# Patient Record
Sex: MALE | Race: ASIAN | Employment: FULL TIME | URBAN - METROPOLITAN AREA
[De-identification: names, ages, dates, MRNs, and addresses within clinical notes are randomized per-mention and may not be internally consistent; named-entity substitution may affect disease eponyms.]

---

## 2017-05-12 ENCOUNTER — RADIANT APPOINTMENT (OUTPATIENT)
Dept: GENERAL RADIOLOGY | Facility: CLINIC | Age: 22
End: 2017-05-12
Attending: INTERNAL MEDICINE
Payer: COMMERCIAL

## 2017-05-12 ENCOUNTER — OFFICE VISIT (OUTPATIENT)
Dept: FAMILY MEDICINE | Facility: CLINIC | Age: 22
End: 2017-05-12
Payer: COMMERCIAL

## 2017-05-12 VITALS
HEIGHT: 66 IN | RESPIRATION RATE: 16 BRPM | HEART RATE: 60 BPM | TEMPERATURE: 98.6 F | OXYGEN SATURATION: 100 % | BODY MASS INDEX: 24.59 KG/M2 | WEIGHT: 153 LBS | SYSTOLIC BLOOD PRESSURE: 110 MMHG | DIASTOLIC BLOOD PRESSURE: 60 MMHG

## 2017-05-12 DIAGNOSIS — M25.571 ACUTE RIGHT ANKLE PAIN: Primary | ICD-10-CM

## 2017-05-12 DIAGNOSIS — M25.571 ACUTE RIGHT ANKLE PAIN: ICD-10-CM

## 2017-05-12 DIAGNOSIS — S93.401A SPRAIN OF RIGHT ANKLE, UNSPECIFIED LIGAMENT, INITIAL ENCOUNTER: ICD-10-CM

## 2017-05-12 PROCEDURE — 99213 OFFICE O/P EST LOW 20 MIN: CPT | Performed by: INTERNAL MEDICINE

## 2017-05-12 PROCEDURE — 73610 X-RAY EXAM OF ANKLE: CPT | Mod: RT

## 2017-05-12 NOTE — PROGRESS NOTES
"Chief Complaint   Patient presents with     Trauma     rt       Initial /60  Pulse 60  Temp 98.6  F (37  C)  Resp 16  Ht 5' 6\" (1.676 m)  Wt 153 lb (69.4 kg)  SpO2 100%  BMI 24.69 kg/m2 Estimated body mass index is 24.69 kg/(m^2) as calculated from the following:    Height as of this encounter: 5' 6\" (1.676 m).    Weight as of this encounter: 153 lb (69.4 kg).  Medication Reconciliation: complete. BIANCA Urbina LPN        SUBJECTIVE:                                                    Timothy Londono is a 22 year old male who presents to clinic today for the following health issues:      Joint Pain     Onset: Wed 5/10/17    Description:   Location: right ankle  Character: ache with weight    Intensity: mild    Progression of Symptoms: same    Accompanying Signs & Symptoms:  Other symptoms: swelling   History:   Previous similar pain: no       Precipitating factors:   Trauma or overuse: YES- stepped on rock and twisted Wed 5/10/17    Alleviating factors:  Improved by: rest/inactivity and ice       Therapies Tried and outcome: ice         Two days ago was in the water walking and stepped on a rock and twisted his ankle. He is still experiencing pain just distal to his lateral malleolus and there is some swelling. He can weight bear and walk.       Problem list and histories reviewed & adjusted, as indicated.  Additional history: as documented    Patient Active Problem List   Diagnosis     Allergic rhinitis     Left ankle pain     High ankle sprain     Edema     Past Surgical History:   Procedure Laterality Date     NO HISTORY OF SURGERY         Social History   Substance Use Topics     Smoking status: Never Smoker     Smokeless tobacco: Never Used      Comment: no passive smoke exposure     Alcohol use Yes     History reviewed. No pertinent family history.        Reviewed and updated as needed this visit by clinical staff  Tobacco  Allergies  Meds  Fam Hx  Soc Hx      Reviewed and updated as needed this visit by " "Provider         ROS:  Constitutional, HEENT, cardiovascular, pulmonary, gi and gu systems are negative, except as otherwise noted.    OBJECTIVE:                                                    /60  Pulse 60  Temp 98.6  F (37  C)  Resp 16  Ht 5' 6\" (1.676 m)  Wt 153 lb (69.4 kg)  SpO2 100%  BMI 24.69 kg/m2  Body mass index is 24.69 kg/(m^2).  GENERAL: healthy, alert and no distress  RESP: lungs clear to auscultation - no rales, rhonchi or wheezes  CV: regular rate and rhythm, normal S1 S2, no S3 or S4, no murmur, click or rub, no peripheral edema and peripheral pulses strong  MS: Localized swelling over lateral malleolus, limited ROM with rotation due to pain, tenderness distal to lateral malleolus    Diagnostic Test Results:  Right ankle 3-view x-ray: Negative for fracture     ASSESSMENT/PLAN:                                                      1. Acute right ankle pain  - XR Ankle Right G/E 3 Views; Future    2. Sprain of right ankle, unspecified ligament, initial encounter  No evidence of fracture so likely a sprain. Recommending ice, elevation, NSAIDS, and an ACE wrap or ankle brace for stability.       Follow up if no improvement in symptoms      Brandi Manley MD  [facility] [place]      "

## 2017-05-12 NOTE — MR AVS SNAPSHOT
"              After Visit Summary   5/12/2017    Timothy Londono    MRN: [id number]           Patient Information     Date Of Birth          1995        Visit Information        Provider Department      5/12/2017 8:00 AM Brandi Manley MD [facility] [place]        Today's Diagnoses     Acute right ankle pain    -  1    Sprain of right ankle, unspecified ligament, initial encounter           Follow-ups after your visit        Who to contact     If you have questions or need follow up information about today's clinic visit or your schedule please contact [facility] [place] directly at [phone].  Normal or non-critical lab and imaging results will be communicated to you by Epiphanyhart, letter or phone within 4 business days after the clinic has received the results. If you do not hear from us within 7 days, please contact the clinic through Epiphanyhart or phone. If you have a critical or abnormal lab result, we will notify you by phone as soon as possible.  Submit refill requests through aiHit or call your pharmacy and they will forward the refill request to us. Please allow 3 business days for your refill to be completed.          Additional Information About Your Visit        MyChart Information     aiHit gives you secure access to your electronic health record. If you see a primary care provider, you can also send messages to your care team and make appointments. If you have questions, please call your primary care clinic.  If you do not have a primary care provider, please call [phone] and they will assist you.        Care EveryWhere ID     This is your Care EveryWhere ID. This could be used by other organizations to access your [place] medical records  [id number]        Your Vitals Were     Pulse Temperature Respirations Height Pulse Oximetry BMI (Body Mass Index)    60 98.6  F (37  C) 16 5' 6\" (1.676 m) 100% 24.69 kg/m2       Blood Pressure from Last 3 Encounters:   05/12/17 110/60 "   12/22/15 112/70   12/21/15 110/70    Weight from Last 3 Encounters:   05/12/17 153 lb (69.4 kg)   12/22/15 163 lb (73.9 kg) (61 %)*   12/21/15 161 lb (73 kg) (58 %)*     * Growth percentiles are based on [facility] 2-20 Years data.               Primary Care Provider Office Phone # Fax #    Fredrick Bird [phone]       [address] [address]        Thank you!     Thank you [place] [facility] [place]. Our goal is always to provide you with excellent care. Hearing back from our patients is one way we can continue to improve our services. Please take a few minutes to complete the written survey that you may receive in the mail after your visit with us. Thank you!             Your Updated Medication List - Protect others around you: Learn how to safely use, store and throw away your medicines at www.disposemymeds.org.          This list is accurate as of: 5/12/17  9:43 AM.  Always use your most recent med list.                   Brand Name Dispense Instructions for use    NO ACTIVE MEDICATIONS      .

## 2018-06-28 ENCOUNTER — OFFICE VISIT (OUTPATIENT)
Dept: FAMILY MEDICINE | Facility: CLINIC | Age: 23
End: 2018-06-28
Payer: COMMERCIAL

## 2018-06-28 VITALS
BODY MASS INDEX: 25.23 KG/M2 | HEART RATE: 63 BPM | OXYGEN SATURATION: 98 % | DIASTOLIC BLOOD PRESSURE: 60 MMHG | WEIGHT: 157 LBS | HEIGHT: 66 IN | TEMPERATURE: 98.3 F | RESPIRATION RATE: 12 BRPM | SYSTOLIC BLOOD PRESSURE: 100 MMHG

## 2018-06-28 DIAGNOSIS — B07.8 FLAT WART: ICD-10-CM

## 2018-06-28 DIAGNOSIS — D22.9 BENIGN NEVUS: Primary | ICD-10-CM

## 2018-06-28 PROCEDURE — 17110 DESTRUCTION B9 LES UP TO 14: CPT | Performed by: FAMILY MEDICINE

## 2018-06-28 NOTE — MR AVS SNAPSHOT
"              After Visit Summary   6/28/2018    Timothy Londono    MRN: [id number]           Patient Information     Date Of Birth          1995        Visit Information        Provider Department      6/28/2018 5:20 PM Fredrick Bird MD [facility] [place]        Today's Diagnoses     Benign nevus    -  1    Flat wart           Follow-ups after your visit        Who to contact     If you have questions or need follow up information about today's clinic visit or your schedule please contact [facility] [place] directly at [phone].  Normal or non-critical lab and imaging results will be communicated to you by Material Mixhart, letter or phone within 4 business days after the clinic has received the results. If you do not hear from us within 7 days, please contact the clinic through Passworkst or phone. If you have a critical or abnormal lab result, we will notify you by phone as soon as possible.  Submit refill requests through pbsi or call your pharmacy and they will forward the refill request to us. Please allow 3 business days for your refill to be completed.          Additional Information About Your Visit        MyChart Information     pbsi gives you secure access to your electronic health record. If you see a primary care provider, you can also send messages to your care team and make appointments. If you have questions, please call your primary care clinic.  If you do not have a primary care provider, please call [phone] and they will assist you.        Care EveryWhere ID     This is your Care EveryWhere ID. This could be used by other organizations to access your [place] medical records  [id number]        Your Vitals Were     Pulse Temperature Respirations Height Pulse Oximetry BMI (Body Mass Index)    63 98.3  F (36.8  C) (Tympanic) 12 5' 6\" (1.676 m) 98% 25.34 kg/m2       Blood Pressure from Last 3 Encounters:   06/28/18 100/60   05/12/17 110/60   12/22/15 112/70    Weight from Last " 3 Encounters:   06/28/18 157 lb (71.2 kg)   05/12/17 153 lb (69.4 kg)   12/22/15 163 lb (73.9 kg) (61 %)*     * Growth percentiles are based on [facility] 2-20 Years data.              We Performed the Following     DESTRUCT PREMALIGNANT LESION, 2-14        Primary Care Provider Office Phone # Fax #    Fredrick Bird [phone]       [address] [place] [address]        Equal Access to Services     [facility] SUNITA : Hadii aad ku hadasho Soomaali, waaxda luqadaha, qaybta kaalmada adeegyada, waxay idiin hayaan adereal sosa . So [facility] [phone].    ATENCIÓN: Si habla español, tiene a macario disposición servicios gratuitos de asistencia lingüística. Llame al [phone].    We comply with applicable federal civil rights laws and [place] laws. We do not discriminate on the basis of race, color, national origin, age, disability, sex, sexual orientation, or gender identity.            Thank you!     Thank you for choosing [facility]  for your care. Our goal is always to provide you with excellent care. Hearing back from our patients is one way we can continue to improve our services. Please take a few minutes to complete the written survey that you may receive in the mail after your visit with us. Thank you!             Your Updated Medication List - Protect others around you: Learn how to safely use, store and throw away your medicines at www.disposemymeds.org.          This list is accurate as of 6/28/18  6:09 PM.  Always use your most recent med list.                   Brand Name Dispense Instructions for use Diagnosis    NO ACTIVE MEDICATIONS      .

## 2018-06-28 NOTE — PROGRESS NOTES
"  SUBJECTIVE:   Timothy Londono is a 23 year old male who presents to clinic today for the following health issues:      Derm Problem      Duration: about 1 year     Description  Location: bottom of left foot   Itching: no    Intensity:  mild    Accompanying signs and symptoms: None    History (similar episodes/previous evaluation): None    Precipitating or alleviating factors:  New exposures:  None  Recent travel: no      Therapies tried and outcome: none    Problem list and histories reviewed & adjusted, as indicated.  Additional history: as documented    Patient Active Problem List   Diagnosis     Allergic rhinitis     Left ankle pain     High ankle sprain     Edema     Past Surgical History:   Procedure Laterality Date     NO HISTORY OF SURGERY         Social History   Substance Use Topics     Smoking status: Never Smoker     Smokeless tobacco: Never Used      Comment: no passive smoke exposure     Alcohol use Yes     No family history on file.      Current Outpatient Prescriptions   Medication Sig Dispense Refill     NO ACTIVE MEDICATIONS .       Allergies   Allergen Reactions     Penicillins Rash     No lab results found.   BP Readings from Last 3 Encounters:   06/28/18 100/60   05/12/17 110/60   12/22/15 112/70    Wt Readings from Last 3 Encounters:   06/28/18 157 lb (71.2 kg)   05/12/17 153 lb (69.4 kg)   12/22/15 163 lb (73.9 kg) (61 %)*     * Growth percentiles are based on CDC 2-20 Years data.                    Reviewed and updated as needed this visit by clinical staff  Allergies       Reviewed and updated as needed this visit by Provider         ROS:  Constitutional, HEENT, cardiovascular, pulmonary, gi and gu systems are negative, except as otherwise noted.    OBJECTIVE:     /60 (Cuff Size: Adult Regular)  Pulse 63  Temp 98.3  F (36.8  C) (Tympanic)  Resp 12  Ht 5' 6\" (1.676 m)  Wt 157 lb (71.2 kg)  SpO2 98%  BMI 25.34 kg/m2  Body mass index is 25.34 kg/(m^2).  GENERAL: healthy, alert and no " distress  NECK: no adenopathy, no asymmetry, masses, or scars and thyroid normal to palpation  RESP: lungs clear to auscultation - no rales, rhonchi or wheezes  CV: regular rate and rhythm, normal S1 S2, no S3 or S4, no murmur, click or rub, no peripheral edema and peripheral pulses strong  ABDOMEN: soft, nontender, no hepatosplenomegaly, no masses and bowel sounds normal  MS: no gross musculoskeletal defects noted, no edema        ASSESSMENT/PLAN:   ASSESSMENT / PLAN:  (D22.9) Benign nevus  (primary encounter diagnosis)  Comment: on nose tip(4mm diameter), treated with liquid nitrogen 3 times without complication   Plan: DESTRUCT PREMALIGNANT LESION, 2-14            (B07.8) Flat wart  Comment: on left foot bottom 3 lesion, treated with liquid nitrogen 3 times without complication   Plan: DESTRUCT PREMALIGNANT LESION, 2-14            FUTURE APPOINTMENTS:       - Follow-up visit in 1 year for CPE    Fredrick Bird MD  [facility] [place]

## 2018-11-23 ENCOUNTER — OFFICE VISIT (OUTPATIENT)
Dept: FAMILY MEDICINE | Facility: CLINIC | Age: 23
End: 2018-11-23
Payer: COMMERCIAL

## 2018-11-23 VITALS
BODY MASS INDEX: 24.86 KG/M2 | SYSTOLIC BLOOD PRESSURE: 114 MMHG | OXYGEN SATURATION: 95 % | DIASTOLIC BLOOD PRESSURE: 56 MMHG | TEMPERATURE: 98.3 F | WEIGHT: 154 LBS | HEART RATE: 78 BPM

## 2018-11-23 DIAGNOSIS — B07.8 FLAT WART: Primary | ICD-10-CM

## 2018-11-23 PROCEDURE — 17110 DESTRUCTION B9 LES UP TO 14: CPT | Performed by: FAMILY MEDICINE

## 2018-11-23 NOTE — PROGRESS NOTES
SUBJECTIVE:   Timothy Londono is a 23 year old male who presents to clinic today for the following health issues:      WART(S)  Onset: over a year     Description:   Location: left bottom foot   Number of warts: 3   Painful: YES    Accompanying Signs & Symptoms:  Signs of infection: no     History:   History of trauma: no   Prior warts: YES    Therapies Tried and outcome: liquid nitrogen    Problem list and histories reviewed & adjusted, as indicated.  Additional history: as documented    Patient Active Problem List   Diagnosis     Allergic rhinitis     Left ankle pain     High ankle sprain     Edema     Past Surgical History:   Procedure Laterality Date     NO HISTORY OF SURGERY         Social History   Substance Use Topics     Smoking status: Never Smoker     Smokeless tobacco: Never Used      Comment: no passive smoke exposure     Alcohol use Yes     No family history on file.      Current Outpatient Prescriptions   Medication Sig Dispense Refill     NO ACTIVE MEDICATIONS .       Allergies   Allergen Reactions     Penicillins Rash     No lab results found.   BP Readings from Last 3 Encounters:   11/23/18 114/56   06/28/18 100/60   05/12/17 110/60    Wt Readings from Last 3 Encounters:   11/23/18 154 lb (69.9 kg)   06/28/18 157 lb (71.2 kg)   05/12/17 153 lb (69.4 kg)                    Reviewed and updated as needed this visit by clinical staff  Allergies  Meds       Reviewed and updated as needed this visit by Provider         ROS:  Constitutional, HEENT, cardiovascular, pulmonary, gi and gu systems are negative, except as otherwise noted.    OBJECTIVE:     /56  Pulse 78  Temp 98.3  F (36.8  C) (Oral)  Wt 154 lb (69.9 kg)  SpO2 95%  BMI 24.86 kg/m2  Body mass index is 24.86 kg/(m^2).  GENERAL: healthy, alert and no distress  NECK: no adenopathy, no asymmetry, masses, or scars and thyroid normal to palpation  RESP: lungs clear to auscultation - no rales, rhonchi or wheezes  CV: regular rate and rhythm,  normal S1 S2, no S3 or S4, no murmur, click or rub, no peripheral edema and peripheral pulses strong  ABDOMEN: soft, nontender, no hepatosplenomegaly, no masses and bowel sounds normal  MS: no gross musculoskeletal defects noted, no edema        ASSESSMENT/PLAN:     Timothy was seen today for wart.    Diagnoses and all orders for this visit:    Flat wart  -     DESTRUCT PREMALIGNANT LESION, 2-14      Recurring wart on left foot(5 lesion) present   Treated with liquid nitrogen 3 times without complication         Fredrick Bird MD  [facility] [place]

## 2018-11-23 NOTE — MR AVS SNAPSHOT
After Visit Summary   11/23/2018    Timothy Londono    MRN: [id number]           Patient Information     Date Of Birth          1995        Visit Information        Provider Department      11/23/2018 1:40 PM Fredrick Bird MD [facility] [place]        Today's Diagnoses     Flat wart    -  1       Follow-ups after your visit        Follow-up notes from your care team     Return in about 3 weeks (around 12/14/2018) for wart.      Who to contact     If you have questions or need follow up information about today's clinic visit or your schedule please contact [facility] [place] directly at [phone].  Normal or non-critical lab and imaging results will be communicated to you by MyChart, letter or phone within 4 business days after the clinic has received the results. If you do not hear from us within 7 days, please contact the clinic through MyChart or phone. If you have a critical or abnormal lab result, we will notify you by phone as soon as possible.  Submit refill requests through [email or web address] or call your pharmacy and they will forward the refill request to us. Please allow 3 business days for your refill to be completed.          Additional Information About Your Visit        MyChart Information     [email or web address] gives you secure access to your electronic health record. If you see a primary care provider, you can also send messages to your care team and make appointments. If you have questions, please call your primary care clinic.  If you do not have a primary care provider, please call [phone] and they will assist you.        Care EveryWhere ID     This is your Care EveryWhere ID. This could be used by other organizations to access your [place] medical records  [id number]        Your Vitals Were     Pulse Temperature Pulse Oximetry BMI (Body Mass Index)          78 98.3  F (36.8  C) (Oral) 95% 24.86 kg/m2         Blood Pressure from Last 3 Encounters:   11/23/18 114/56   06/28/18  100/60   05/12/17 110/60    Weight from Last 3 Encounters:   11/23/18 154 lb (69.9 kg)   06/28/18 157 lb (71.2 kg)   05/12/17 153 lb (69.4 kg)              We Performed the Following     DESTRUCT PREMALIGNANT LESION, 2-14        Primary Care Provider Office Phone # Fax #    Fredrick AMANDA Bird [phone]       [address] [facility] [address]        Equal Access to Services     YAKELIN DORSEY : Hadii aad ku hadasho Soomaali, waaxda luqadaha, qaybta kaalmada adeegyada, waxay idiin hayaan adereal khisaias sosa . So [facility] [phone].    ATENCIÓN: Si habla español, tiene a macario disposición servicios gratuitos de asistencia lingüística. Llame al [phone].    We comply with applicable federal civil rights laws and [place] laws. We do not discriminate on the basis of race, color, national origin, age, disability, sex, sexual orientation, or gender identity.            Thank you!     Thank you for [place] [facility]. Our goal is always to provide you with excellent care. Hearing back from our patients is one way we can continue to improve our services. Please take a few minutes to complete the written survey that you may receive in the mail after your visit with us. Thank you!             Your Updated Medication List - Protect others around you: Learn how to safely use, store and throw away your medicines at www.disposemymeds.org.          This list is accurate as of 11/23/18  2:15 PM.  Always use your most recent med list.                   Brand Name Dispense Instructions for use Diagnosis    NO ACTIVE MEDICATIONS      .

## 2020-02-10 ENCOUNTER — HEALTH MAINTENANCE LETTER (OUTPATIENT)
Age: 25
End: 2020-02-10

## 2020-07-20 LAB
DEPRECATED S PYO AG THROAT QL EIA: NORMAL
SPECIMEN SOURCE: NORMAL

## 2020-07-20 NOTE — PROGRESS NOTES
"Date: 2020 12:57:35  Clinician: Gio De La O  Clinician NPI: [id number]  Patient: Timothy Londono  Patient : 1995  Patient Address: [address] [place] [address], [place] [address]  Patient Phone: [phone]  Visit Protocol: URI  Patient Summary:  Timothy is a 25 year old ( : 1995 ) male who initiated a Visit for COVID-19 (Coronavirus) evaluation and screening. When asked the question \"Please sign me up to receive news, health information and promotions. \", Timothy responded \"No\".    Timothy states his symptoms started today.   His symptoms consist of diarrhea, malaise, a sore throat, and myalgia.   Symptom details   Sore throat: Timothy reports having mild throat pain (1-3 on a 10 point pain scale), does not have exudate on his tonsils, and can swallow liquids. He is not sure if the lymph nodes in his neck are enlarged. A rash has not appeared on the skin since the sore throat started.    Timothy denies having wheezing, nausea, teeth pain, ageusia, vomiting, rhinitis, ear pain, headache, chills, anosmia, facial pain or pressure, fever, cough, and nasal congestion. He also denies having recent facial or sinus surgery in the past 60 days and taking antibiotic medication in the past month. He is not experiencing dyspnea.   Precipitating events  Timothy is not sure if he has been exposed to someone with strep throat. He has not recently been exposed to someone with influenza. Timothy has been in close contact with the following high risk individuals: adults 65 or older.   Pertinent COVID-19 (Coronavirus) information  In the past 14 days, Timothy has not worked in a congregate living setting.   He does not work or volunteer as healthcare worker or a  and does not work or volunteer in a healthcare facility.   Timothy also has not lived in a congregate living setting in the past 14 days. He does not live with a healthcare worker.   Timothy has not had a close contact with a laboratory-confirmed COVID-19 patient " within 14 days of symptom onset.   Pertinent medical history  Timothy does not need a return to work/school note.   Weight: 150 lbs   Timothy does not smoke or use smokeless tobacco.   Weight: 150 lbs    MEDICATIONS: No current medications, ALLERGIES: NKDA  Clinician Response:  Dear Timothy,   Your symptoms show that you may have coronavirus (COVID-19). This illness can cause fever, cough and trouble breathing. Many people get a mild case and get better on their own. Some people can get very sick.  What should I do?  We would like to test you for this virus.   1. Please call [phone] to schedule your visit. Explain that you were referred by [facility] to have a COVID-19 test. Be ready to [facility].  The following will serve as your written order for this COVID Test, ordered by me, for the indication of suspected COVID [Z20.828]: The test will be ordered in [employer], our electronic health record, after you are scheduled. It will show as ordered and authorized by Flakito Yarbrough MD.  Order: COVID-19 (Coronavirus) PCR for SYMPTOMATIC testing from [facility].      2. When it's time for your COVID test:  Stay at least 6 feet away from others. (If someone will drive you to your test, stay in the backseat, as far away from the  as you can.)   Cover your mouth and nose with a mask, tissue or washcloth.  Go straight to the testing site. Don't make any stops on the way there or back.      3.Starting now: Stay home and away from others (self-isolate) until:   You've had no fever---and no medicine that reduces fever---for 3 full days (72 hours). And...   Your other symptoms have gotten better. For example, your cough or breathing has improved. And...   At least 10 days have passed since your symptoms started.       During this time, don't leave the house except for testing or medical care.   Stay in your own room, even for meals. Use your own bathroom if you can.   Stay away from others in your home. No hugging,  "kissing or shaking hands. No visitors.  Don't go to work, school or anywhere else.    Clean \"high touch\" surfaces often (doorknobs, counters, handles, etc.). Use a household cleaning spray or wipes. You'll find a full list of  on the EPA website: www.epa.gov/pesticide-registration/list-n-disinfectants-use-against-sars-cov-2.   Cover your mouth and nose with a mask, tissue or washcloth to avoid spreading germs.  Wash your hands and face often. Use soap and water.  Caregivers in these groups are at risk for severe illness due to COVID-19:  o People 65 years and older  o People who live in a nursing home or long-term care facility  o People with chronic disease (lung, heart, cancer, diabetes, kidney, liver, immunologic)  o People who have a weakened immune system, including those who:   Are in cancer treatment  Take medicine that weakens the immune system, such as corticosteroids  Had a bone marrow or organ transplant  Have an immune deficiency  Have poorly controlled HIV or AIDS  Are obese (body mass index of 40 or higher)  Smoke regularly   o Caregivers should wear gloves while washing dishes, handling laundry and cleaning bedrooms and bathrooms.  o Use caution when washing and drying laundry: Don't shake dirty laundry, and use the warmest water setting that you can.  o For more tips, go to www.cdc.gov/coronavirus/2019-ncov/downloads/10Things.pdf.    4.Sign up for [employer]. We know it's scary to hear that you might have COVID-19. We want to track your symptoms to make sure you're okay over the next 2 weeks. Please look for an email from youcalc---this is a free, online program that we'll use to keep in touch. To sign up, follow the link in the email. Learn more at [email or web address]  How can I take care of myself?   Get lots of rest. Drink extra fluids (unless a doctor has told you not to).   Take Tylenol (acetaminophen) for fever or pain. If you have liver or kidney problems, ask your " family doctor if it's okay to take Tylenol.   Adults can take either:    650 mg (two 325 mg pills) every 4 to 6 hours, or...   1,000 mg (two 500 mg pills) every 8 hours as needed.    Note: Don't take more than 3,000 mg in one day. Acetaminophen is found in many medicines (both prescribed and over-the-counter medicines). Read all labels to be sure you don't take too much.   For children, check the Tylenol bottle for the right dose. The dose is based on the child's age or weight.    If you have other health problems (like cancer, heart failure, an organ transplant or severe kidney disease): Call your specialty clinic if you don't feel better in the next 2 days.       Know when to call 911. Emergency warning signs include:    Trouble breathing or shortness of breath Pain or pressure in the chest that doesn't go away Feeling confused like you haven't felt before, or not being able to wake up Bluish-colored lips or face.  Where can I get more information?   [facility] -- About COVID-19: [email or web address]   CDC -- What to Do If You're Sick: www.cdc.gov/coronavirus/2019-ncov/about/steps-when-sick.html   CDC -- Ending Home Isolation: www.cdc.gov/coronavirus/2019-ncov/hcp/disposition-in-home-patients.html   CDC -- Caring for Someone: www.cdc.gov/coronavirus/2019-ncov/if-you-are-sick/care-for-someone.html   [facility] -- Interim Guidance for Hospital Discharge to Home: www.health.[facility].[place]/diseases/coronavirus/hcp/hospdischarge.pdf   [facility] clinical trials (COVID-19 research studies): clinicalaffairs.[facility].edu/umn-clinical-trials    Below are the COVID-19 hotlines at the [place] Department of Health ([facility]). Interpreters are available.    For health questions: Call 382-909-9406 or 1-301.592.7537 (7 a.m. to 7 p.m.) For questions about schools and childcare: Call 936-688-0744 or 1-186.107.8067 (7 a.m. to 7 p.m.)       [facility] Strep Test    I am sorry you are not feeling well. Based on your lab  results, you do not have strep throat. This means your symptoms are caused by a virus and not the bacteria that causes strep throat. Antibiotic medications are not effective against a viral infection and will not help symptoms improve or make the condition less contagious.  The following tips will keep you as comfortable as possible while you recover:     Rest    Drink plenty of water and other liquids    Take a hot shower to loosen congestion    Use throat lozenges    Gargle with warm salt water (1/4 teaspoon of salt per 8 ounce glass of water)    Suck on frozen items such as popsicles or ice cubes    Drink hot tea with lemon and honey     Please be seen in a clinic or urgent care if new symptoms develop, or symptoms become worse.  Call 911 or go to the emergency room if you suddenly develop a rash, are drooling or unable to swallow fluids, if you are having difficulty breathing, or feel that your throat is closing off.  Because strep throat can be easily spread to others, proof of evaluation by a provider is often requested before returning to work, school, or . The statement below summarizes my evaluation. Please print a copy of this Visit if written verification is needed.  Timothy was evaluated for strep throat and lab testing was completed. As a result, strep throat was ruled out and symptoms are the result of a viral infection. Antibiotics were not prescribed because they are not an effective treatment for viral infections and will not make it less contagious. Timothy can return to work, school, or  if he has not had a fever for 24 hours without the use of a fever-reducing medication and feels well enough for daily activities.   Diagnosis: Pain in throat  Diagnosis ICD: J02.9  [facility] Results: SSCRNT: NEGATIVE: No Group A streptococcal antigen detected by immunoassay, await  culture report.  [facility] Secondary Results: CULT: No Beta Streptococcus isolated

## 2020-07-21 ENCOUNTER — VIRTUAL VISIT (OUTPATIENT)
Dept: LAB | Facility: CLINIC | Age: 25
End: 2020-07-21
Payer: COMMERCIAL

## 2020-07-21 DIAGNOSIS — R07.0 THROAT PAIN: Primary | ICD-10-CM

## 2020-07-22 LAB
BACTERIA SPEC CULT: NORMAL
Lab: NORMAL
SPECIMEN SOURCE: NORMAL

## 2020-07-23 DIAGNOSIS — Z20.822 COVID-19 RULED OUT: Primary | ICD-10-CM

## 2020-07-23 PROCEDURE — U0003 INFECTIOUS AGENT DETECTION BY NUCLEIC ACID (DNA OR RNA); SEVERE ACUTE RESPIRATORY SYNDROME CORONAVIRUS 2 (SARS-COV-2) (CORONAVIRUS DISEASE [COVID-19]), AMPLIFIED PROBE TECHNIQUE, MAKING USE OF HIGH THROUGHPUT TECHNOLOGIES AS DESCRIBED BY CMS-2020-01-R: HCPCS | Performed by: FAMILY MEDICINE

## 2020-07-24 LAB
SARS-COV-2 RNA SPEC QL NAA+PROBE: NOT DETECTED
SPECIMEN SOURCE: NORMAL

## 2020-11-16 ENCOUNTER — HEALTH MAINTENANCE LETTER (OUTPATIENT)
Age: 25
End: 2020-11-16

## 2020-12-17 ENCOUNTER — HOSPITAL ENCOUNTER (OUTPATIENT)
Dept: ULTRASOUND IMAGING | Facility: CLINIC | Age: 25
Discharge: HOME | End: 2020-12-17
Attending: FAMILY MEDICINE | Admitting: FAMILY MEDICINE
Payer: COMMERCIAL

## 2020-12-17 ENCOUNTER — OFFICE VISIT (OUTPATIENT)
Dept: ORTHOPEDICS | Facility: CLINIC | Age: 25
End: 2020-12-17
Payer: COMMERCIAL

## 2020-12-17 ENCOUNTER — ANCILLARY PROCEDURE (OUTPATIENT)
Dept: GENERAL RADIOLOGY | Facility: CLINIC | Age: 25
End: 2020-12-17
Attending: FAMILY MEDICINE
Payer: COMMERCIAL

## 2020-12-17 ENCOUNTER — HOSPITAL ENCOUNTER (EMERGENCY)
Facility: CLINIC | Age: 25
Discharge: HOME | End: 2020-12-17
Attending: PHYSICIAN ASSISTANT | Admitting: PHYSICIAN ASSISTANT
Payer: COMMERCIAL

## 2020-12-17 VITALS
SYSTOLIC BLOOD PRESSURE: 123 MMHG | RESPIRATION RATE: 16 BRPM | WEIGHT: 150 LBS | DIASTOLIC BLOOD PRESSURE: 63 MMHG | TEMPERATURE: 97.2 F | HEIGHT: 67 IN | OXYGEN SATURATION: 99 % | HEART RATE: 61 BPM | BODY MASS INDEX: 23.54 KG/M2

## 2020-12-17 VITALS
SYSTOLIC BLOOD PRESSURE: 121 MMHG | DIASTOLIC BLOOD PRESSURE: 83 MMHG | HEIGHT: 67 IN | WEIGHT: 150 LBS | BODY MASS INDEX: 23.54 KG/M2

## 2020-12-17 DIAGNOSIS — I82.401 RIGHT LEG DVT (H): ICD-10-CM

## 2020-12-17 DIAGNOSIS — M79.661 RIGHT CALF PAIN: Primary | ICD-10-CM

## 2020-12-17 DIAGNOSIS — M79.661 RIGHT CALF PAIN: ICD-10-CM

## 2020-12-17 LAB
ANION GAP SERPL CALCULATED.3IONS-SCNC: 4 MMOL/L (ref 3–14)
BASOPHILS # BLD AUTO: 0 10E9/L (ref 0–0.2)
BASOPHILS NFR BLD AUTO: 0.4 %
BUN SERPL-MCNC: 12 MG/DL (ref 7–30)
CALCIUM SERPL-MCNC: 9 MG/DL (ref 8.5–10.1)
CHLORIDE SERPL-SCNC: 107 MMOL/L (ref 94–109)
CO2 SERPL-SCNC: 28 MMOL/L (ref 20–32)
CREAT SERPL-MCNC: 0.73 MG/DL (ref 0.66–1.25)
DIFFERENTIAL METHOD BLD: NORMAL
EOSINOPHIL # BLD AUTO: 0.2 10E9/L (ref 0–0.7)
EOSINOPHIL NFR BLD AUTO: 2.8 %
ERYTHROCYTE [DISTWIDTH] IN BLOOD BY AUTOMATED COUNT: 10.9 % (ref 10–15)
GFR SERPL CREATININE-BSD FRML MDRD: >90 ML/MIN/{1.73_M2}
GLUCOSE SERPL-MCNC: 85 MG/DL (ref 70–99)
HCT VFR BLD AUTO: 43.3 % (ref 40–53)
HGB BLD-MCNC: 14.8 G/DL (ref 13.3–17.7)
IMM GRANULOCYTES # BLD: 0 10E9/L (ref 0–0.4)
IMM GRANULOCYTES NFR BLD: 0.1 %
LYMPHOCYTES # BLD AUTO: 2.2 10E9/L (ref 0.8–5.3)
LYMPHOCYTES NFR BLD AUTO: 32.7 %
MCH RBC QN AUTO: 31 PG (ref 26.5–33)
MCHC RBC AUTO-ENTMCNC: 34.2 G/DL (ref 31.5–36.5)
MCV RBC AUTO: 91 FL (ref 78–100)
MONOCYTES # BLD AUTO: 0.4 10E9/L (ref 0–1.3)
MONOCYTES NFR BLD AUTO: 6.6 %
NEUTROPHILS # BLD AUTO: 3.8 10E9/L (ref 1.6–8.3)
NEUTROPHILS NFR BLD AUTO: 57.4 %
NRBC # BLD AUTO: 0 10*3/UL
NRBC BLD AUTO-RTO: 0 /100
PLATELET # BLD AUTO: 185 10E9/L (ref 150–450)
POTASSIUM SERPL-SCNC: 3.9 MMOL/L (ref 3.4–5.3)
RBC # BLD AUTO: 4.77 10E12/L (ref 4.4–5.9)
SODIUM SERPL-SCNC: 139 MMOL/L (ref 133–144)
WBC # BLD AUTO: 6.7 10E9/L (ref 4–11)

## 2020-12-17 PROCEDURE — 99283 EMERGENCY DEPT VISIT LOW MDM: CPT

## 2020-12-17 PROCEDURE — 80048 BASIC METABOLIC PNL TOTAL CA: CPT | Performed by: PHYSICIAN ASSISTANT

## 2020-12-17 PROCEDURE — 93971 EXTREMITY STUDY: CPT | Mod: RT

## 2020-12-17 PROCEDURE — 85025 COMPLETE CBC W/AUTO DIFF WBC: CPT | Performed by: PHYSICIAN ASSISTANT

## 2020-12-17 PROCEDURE — 250N000013 HC RX MED GY IP 250 OP 250 PS 637: Performed by: PHYSICIAN ASSISTANT

## 2020-12-17 PROCEDURE — 99284 EMERGENCY DEPT VISIT MOD MDM: CPT | Mod: 25

## 2020-12-17 PROCEDURE — 73590 X-RAY EXAM OF LOWER LEG: CPT | Mod: RT | Performed by: FAMILY MEDICINE

## 2020-12-17 PROCEDURE — 99204 OFFICE O/P NEW MOD 45 MIN: CPT | Performed by: FAMILY MEDICINE

## 2020-12-17 RX ADMIN — RIVAROXABAN 15 MG: 15 TABLET, FILM COATED ORAL at 17:41

## 2020-12-17 ASSESSMENT — MIFFLIN-ST. JEOR
SCORE: 1629.03
SCORE: 1632.99

## 2020-12-17 ASSESSMENT — ENCOUNTER SYMPTOMS: MYALGIAS: 1

## 2020-12-17 NOTE — ED PROVIDER NOTES
History     Chief Complaint:  Leg Swelling       The history is provided by the patient.     Timothy Londono is a 25 year old male who presents for evaluation of right lower leg pain and swelling. The patient reports that he is an avid runner and recently ran a marathon over Thanksgiving. He waited ten days before running again and his most recent run was 10 days ago. Two days after his run, he woke up and began to experience right calf pain and swelling. He presented to urgent care four days ago and was diagnosed with a muscle strain. HE has been wrapping his leg with an ACE bandage since. In the past few days, his pain has moved from his upper ankle into the middle of his posterior calf and the right side of his calf. He has been limping due to his calf pain. Ultimately, his pain had not resolved today so presented to [facility] clinic who obtained an xray, results below, and referred him for outpatient US out of concern for DVT. US showed a DVT and he was escorted to the ED for further treatment by an ultrasound technician. Here, he denies any fevers or shortness of breath. He denies any recent travel or prolonged immobilization. He does smoke on occasion. He has never previously been on a blood thinner.     US Lower Extremity Venous Duplex Right from 12/17/2020:  IMPRESSION:   1.  Acute deep venous thrombosis of the right popliteal and posterior  tibial veins. Patient was sent to the emergency room for further  evaluation and treatment.  Reading per radiology.     XR Tibia & Fibula from 12/17/2020:  Mild upper calf soft tissue swelling, undefined.  Otherwise normal tibia   and fibula, without acute fracture.  Reading per radiology.     Review of Systems   Cardiovascular: Positive for leg swelling (right calf).   Musculoskeletal: Positive for myalgias (right calf).   All other systems reviewed and are negative.    Allergies:  Penicillins      Medications:   The patient is not currently taking any prescribed  "medications.      Medical History:   History reviewed. The patient denies any medical history including clotting disorders.      Family History:   The patient denies any family history of clotting disorders.     Social History:  The patient was unaccompanied to the ED.  Smoking Status: Current - once in a while  PCP: Fredrick Bird        Physical Exam     Patient Vitals for the past 24 hrs:   BP Temp Temp src Pulse Resp SpO2 Height Weight   12/17/20 1612 123/63 97.2  F (36.2  C) Temporal 61 16 99 % 1.702 m (5' 7\") 68 kg (150 lb)      Physical Exam  General: Resting comfortably.  Alert and oriented.   Head:  The scalp, face, and head appear normal   Eyes:  Conjunctivae and sclerae are normal    CV:  Normal S1/S2.  Regular rate and rhythm.  DP and PT pulses intact to right foot.  Capillary refill is brisk in all digits of the right foot  Resp:  No tachypnea.  No respiratory distress.  Equal air entry.  No focal findings.  MS:  Tenderness to the right posterior calf extending from the low-mid calf to the upper mid calf. Most pronounced on the medial aspect of the extremity. The patient can wiggle toes without difficulty, and can dorsi and plantarflex the right ankle without difficulty.  Patient can flex and extend at the right knee without difficulty.    Skin:  Mild swelling noted to the right lower extremity most prominent at the mid calf.  This does appear mildly asymmetric when grossly compared to the left.  There is no significant warmth or erythema.  No lacerations or abrasions.  No signs of cellulitis.  Neuro:  Sensation intact throughout right lower extremity.    Emergency Department Course     Laboratory:    CBC: WBC 6.7, HGB 14.8,   BMP: (Creatinine 0.73) [facility]     Emergency Department Course:    Reviewed:  1625 I reviewed the patient's nursing notes, vitals, past medical records, Care Everywhere.     Assessments:  1628 I performed an exam of the patient, as documented above.   1646 Patient rechecked and " updated. We spoke about options for anticoagulation therapy.   1710 I again spoke with the patient regarding anticoagulation options.     Interventions:  1741 Xarelto 15 mg PO    Disposition:  The patient was discharged to home.     Findings and plan explained to the Patient. Patient discharged home with instructions regarding supportive care, medications, and reasons to return. The importance of close follow-up was reviewed. The patient was prescribed as below.      Impression & Plan     Medical Decision Making:  Timothy Londono is a 25 year old male who presents for evaluation of right calf swelling and pain.  Please refer to the HPI for full details.  This started 10 days ago, several days after running a marathon.  He thought it was likely a strain.  However, it continued to be symptomatic which prompted his evaluation in clinic today.  They obtained an x-ray and ultrasound.  Unfortunately, the ultrasound demonstrates a DVT to the popliteal and the posterior tibial veins.  There are no signs of proximal DVT. This was discussed with the patient and he expresses understanding.  There are no symptoms to suggest this has progressed to pulmonary embolism.  Discussed that given his DVT, this will require anticoagulation.  Discussed the options including DOAC's, versus warfarin/Lovenox.  After discussion and after calling his insurance company, he opted for Xarelto.  First dose was given here.  Did discuss the risks of the medication including increased risk of bleeding.  Discussed that he will bleed more easily if injured and he should be evaluated immediately if he has any sort of head injury or signs of GI bleeding such as bloody stool, etc.  I also discussed that he should avoid NSAID medications, steroids, alcohol as this can irritate the stomach lining, and make GI bleeds more likely.  He expresses understanding of all these, and does not have any contraindications.  I would consider this an unprovoked DVT and there is  no clear risk factor identified.  I discussed that he needs to follow-up with a primary care doctor in the next 2 to 5 days for recheck of symptoms and also discussion about duration of treatment, and discussion about a hypercoagulable work-up.  Overall, I believe the patient safe for discharge home.  Basic labs are obtained and are unremarkable.  Follow-up as above.  Red flag symptoms, and reasons for return were discussed and understood.  All questions were answered prior to discharge.  The patient understands and agrees to this plan.      Diagnosis:     ICD-10-CM    1. Right leg DVT (H)  I82.401         Disposition:  Discharged to home.    Discharge Medications:  New Prescriptions    RIVAROXABAN ANTICOAGULANT (XARELTO ANTICOAGULANT) 15 MG TABS TABLET    Take 1 tablet (15 mg) by mouth 2 times daily (with meals) for 21 days    RIVAROXABAN ANTICOAGULANT (XARELTO ANTICOAGULANT) 20 MG TABS TABLET    Take 1 tablet (20 mg) by mouth daily (with dinner) for 7 days       Scribe Disclosure:  I, Nannette Gillespie, am serving as a scribe at 4:16 PM on 12/17/2020 to document services personally performed by Rosy Lee PA-C based on my observations and the provider's statements to me.      Rosy Lee PA-C  12/17/20 1810       Rosy Lee PA-C  12/17/20 2006

## 2020-12-17 NOTE — ED AVS SNAPSHOT
[facility]  [address] [address]  Phone: [phone]  Fax: [phone]                                    Timothy Londono   MRN: [id number]    Department: [facility]   Date of Visit: 12/17/2020           After Visit Summary Signature Page    I have received my discharge instructions, and my questions have been answered. I have discussed any challenges I see with this plan with the nurse or doctor.    ..........................................................................................................................................  Patient/Patient Representative Signature      ..........................................................................................................................................  Patient Representative Print Name and Relationship to Patient    ..................................................               ................................................  Date                                   Time    ..........................................................................................................................................  Reviewed by Signature/Title    ...................................................              ..............................................  Date                                               Time          22EPIC Rev 08/18

## 2020-12-17 NOTE — ED TRIAGE NOTES
Pt presents from US as he was getting an outpatient US for r/o DVT. Pt developed pain in the R leg last Wednesday. The US tech brought pt to ED due to a positive US. No hx of DVTs- denies CP/sob

## 2020-12-17 NOTE — PROGRESS NOTES
[facility]   Clinic Visit s Dec 17, 2020    PCP: Fredrick Bird    ASSESSMENT/PLAN    ICD-10-CM    1. Right calf pain  M79.661 XR Tibia & Fibula Right 2 Views     US Lower Extremity Venous Duplex Bilateral     Suspect muscular calf strain from running regularly following an untrained marathon, but differential diagnosis include stress fracture or DVT.  X-rays negative but does not eliminate the possibility of stress fracture.  DVT seems unlikely but carries high clinical significance therefore ultrasound ordered and will notify patient of results.        Today's Visit:  Timothy is a 25 year old male who is seen as self referral for   Chief Complaint   Patient presents with     Right Lower Leg - Pain       Injury: Reports insidious onset without acute precipitating event. He is an avid runner, [facility] He states last Monday  - a full 10 days after the marathon - he went for a run and 2 days later on Wednesday he noticed onset of pain in his right calf. That Sunday he went to an urgent care and was told he had a strain, has been using an ACE wrap since. 3-9 miles 3-4x a week prior to this virtual marathon.     Location of Pain: right calf/posterior lower leg, nonradiating   Duration of Pain: acute, 1.5 week(s),   Rating of Pain at worst: 6/10  Rating of Pain Currently: 4/10  Pain is better with: CBD cream, ice, heat, and compression   Pain is worse with: pain with squeezing calf muscle, pain is worse in the morning, walking with knee flexed, squatting, stairs  Treatment so far consists of: CBD cream, compression, heat and ice  Associated symptoms: tightness, and swelling Mild  Recent imaging completed: No recent imaging completed.  Prior History of related problems: states has sprained both ankles before     Social History: is employed as a       Past Medical History:   Diagnosis Date     Allergic rhinitis     spring       Patient Active Problem List     "Diagnosis Date Noted     Left ankle pain 05/30/2014     Priority: Medium     High ankle sprain 05/30/2014     Priority: Medium     Edema 05/30/2014     Priority: Medium     Allergic rhinitis      Priority: Medium     spring         [facility] denies sig illnesses.      Social History     Socioeconomic History     Marital status: Single     Spouse name: single     Number of children: 0     Years of education: Not on file     Highest education level: Not on file   Occupational History     Occupation: student   Social Needs     Financial resource strain: Not on file     Food insecurity     Worry: Not on file     Inability: Not on file     Transportation needs     Medical: Not on file     Non-medical: Not on file   Tobacco Use     Smoking status: Never Smoker     Smokeless tobacco: Never Used     Tobacco comment: no passive smoke exposure   Substance and Sexual Activity     Alcohol use: Yes     Drug use: No     Sexual activity: Yes   Lifestyle     Physical activity     Days per week: Not on file     Minutes per session: Not on file     Stress: Not on file   Relationships     Social connections     Talks on phone: Not on file     Gets together: Not on file     Attends [employer] service: Not on file     Active member of club or organization: Not on file     Attends meetings of clubs or organizations: Not on file     Relationship status: Not on file     Intimate partner violence     Fear of current or ex partner: Not on file     Emotionally abused: Not on file     Physically abused: Not on file     Forced sexual activity: Not on file   Other Topics Concern     Not on file   Social History Narrative     Not on file       Past Surgical History:   Procedure Laterality Date     NO HISTORY OF SURGERY                 Review of Systems   Musculoskeletal: Positive for joint pain.   All other systems reviewed and are negative.        Physical Exam  /83   Ht 1.708 m (5' 7.25\")   Wt 68 kg (150 lb)   BMI 23.32 kg/m  "   Constitutional:well-developed, well-nourished, and in no distress.   Cardiovascular: Intact distal pulses.    Neurological: alert. Gait Abnormal:   Antalgic gait  Skin: Skin is warm and dry.   Psychiatric: Mood and affect normal.   Respiratory: unlabored, speaks in full sentences  Lymph: no LAD, no lymphangitis          Right Ankle Exam     Tenderness   Right ankle tenderness location: postero-lateral upper 1/3 calf.  Swelling: moderate    Range of Motion   Dorsiflexion: 15   Plantar flexion:  15 abnormal   Eversion: normal   Inversion: normal     Muscle Strength   Dorsiflexion:  5/5  Plantar flexion:  5/5  Anterior tibial:  5/5  Posterior tibial:  5/5  Gastrocsoleus:  5/5  Peroneal muscle:  5/5    Tests   Anterior drawer: negative  Varus tilt: negative    Other   Erythema: absent  Scars: absent  Sensation: normal  Pulse: present             X-ray images Ordered and independently reviewed by me in the office today with the patient. X-ray shows:   Recent Results (from the past 48 hour(s))   XR Tibia & Fibula Right 2 Views    Narrative    12/17/2020    Mild upper calf soft tissue swelling, undefined.  Otherwise normal tibia   and fibula, without acute fracture.

## 2020-12-17 NOTE — LETTER
12/17/2020         RE: Timothy Londono  [address]  [place] [address]        Dear Colleague,    Thank you for referring your patient, Timothy Londono, to the [facility] [place]. Please see a copy of my visit note below.    [facility]   Clinic Visit s Dec 17, 2020    PCP: Fredrick Bird    ASSESSMENT/PLAN    ICD-10-CM    1. Right calf pain  M79.661 XR Tibia & Fibula Right 2 Views     US Lower Extremity Venous Duplex Bilateral     Suspect muscular calf strain from running regularly following an untrained marathon, but differential diagnosis include stress fracture or DVT.  X-rays negative but does not eliminate the possibility of stress fracture.  DVT seems unlikely but carries high clinical significance therefore ultrasound ordered and will notify patient of results.        Today's Visit:  Timothy is a 25 year old male who is seen as self referral for   Chief Complaint   Patient presents with     Right Lower Leg - Pain       Injury: Reports insidious onset without acute precipitating event. He is an avid runner, he ran a marathon over [facility]. He states last Monday  - a full 10 days after the marathon - he went for a run and 2 days later on Wednesday he noticed onset of pain in his right calf. That Sunday he went to an urgent care and was told he had a strain, has been using an ACE wrap since. 3-9 miles 3-4x a week prior to this virtual marathon.     Location of Pain: right calf/posterior lower leg, nonradiating   Duration of Pain: acute, 1.5 week(s),   Rating of Pain at worst: 6/10  Rating of Pain Currently: 4/10  Pain is better with: CBD cream, ice, heat, and compression   Pain is worse with: pain with squeezing calf muscle, pain is worse in the morning, walking with knee flexed, squatting, stairs  Treatment so far consists of: CBD cream, compression, heat and ice  Associated symptoms: tightness, and swelling Mild  Recent imaging completed: No recent  imaging completed.  Prior History of related problems: states has sprained both ankles before     Social History: is employed as a       Past Medical History:   Diagnosis Date     Allergic rhinitis     spring       Patient Active Problem List    Diagnosis Date Noted     Left ankle pain 05/30/2014     Priority: Medium     High ankle sprain 05/30/2014     Priority: Medium     Edema 05/30/2014     Priority: Medium     Allergic rhinitis      Priority: Medium     spring         FMHX denies sig illnesses.      Social History     Socioeconomic History     Marital status: Single     Spouse name: single     Number of children: 0     Years of education: Not on file     Highest education level: Not on file   Occupational History     Occupation: student   Social Needs     Financial resource strain: Not on file     Food insecurity     Worry: Not on file     Inability: Not on file     Transportation needs     Medical: Not on file     Non-medical: Not on file   Tobacco Use     Smoking status: Never Smoker     Smokeless tobacco: Never Used     Tobacco comment: no passive smoke exposure   Substance and Sexual Activity     Alcohol use: Yes     Drug use: No     Sexual activity: Yes   Lifestyle     Physical activity     Days per week: Not on file     Minutes per session: Not on file     Stress: Not on file   Relationships     Social connections     Talks on phone: Not on file     Gets together: Not on file     Attends [employer] service: Not on file     Active member of club or organization: Not on file     Attends meetings of clubs or organizations: Not on file     Relationship status: Not on file     Intimate partner violence     Fear of current or ex partner: Not on file     Emotionally abused: Not on file     Physically abused: Not on file     Forced sexual activity: Not on file   Other Topics Concern     Not on file   Social History Narrative     Not on file       Past Surgical History:   Procedure Laterality Date      "NO HISTORY OF SURGERY                 Review of Systems   Musculoskeletal: Positive for joint pain.   All other systems reviewed and are negative.        Physical Exam  /83   Ht 1.708 m (5' 7.25\")   Wt 68 kg (150 lb)   BMI 23.32 kg/m    Constitutional:well-developed, well-nourished, and in no distress.   Cardiovascular: Intact distal pulses.    Neurological: alert. Gait Abnormal:   Antalgic gait  Skin: Skin is warm and dry.   Psychiatric: Mood and affect normal.   Respiratory: unlabored, speaks in full sentences  Lymph: no LAD, no lymphangitis          Right Ankle Exam     Tenderness   Right ankle tenderness location: postero-lateral upper 1/3 calf.  Swelling: moderate    Range of Motion   Dorsiflexion: 15   Plantar flexion:  15 abnormal   Eversion: normal   Inversion: normal     Muscle Strength   Dorsiflexion:  5/5  Plantar flexion:  5/5  Anterior tibial:  5/5  Posterior tibial:  5/5  Gastrocsoleus:  5/5  Peroneal muscle:  5/5    Tests   Anterior drawer: negative  Varus tilt: negative    Other   Erythema: absent  Scars: absent  Sensation: normal  Pulse: present             X-ray images Ordered and independently reviewed by me in the office today with the patient. X-ray shows:   Recent Results (from the past 48 hour(s))   XR Tibia & Fibula Right 2 Views    Narrative    12/17/2020    Mild upper calf soft tissue swelling, undefined.  Otherwise normal tibia   and fibula, without acute fracture.           Again, thank you for allowing me to participate in the care of your patient.        Sincerely,        Cheo Biswas MD    "

## 2020-12-18 ENCOUNTER — OFFICE VISIT (OUTPATIENT)
Dept: FAMILY MEDICINE | Facility: CLINIC | Age: 25
End: 2020-12-18
Payer: COMMERCIAL

## 2020-12-18 ENCOUNTER — TELEPHONE (OUTPATIENT)
Dept: ORTHOPEDICS | Facility: CLINIC | Age: 25
End: 2020-12-18

## 2020-12-18 VITALS
SYSTOLIC BLOOD PRESSURE: 128 MMHG | OXYGEN SATURATION: 98 % | BODY MASS INDEX: 23.18 KG/M2 | TEMPERATURE: 97.7 F | HEART RATE: 60 BPM | WEIGHT: 148 LBS | DIASTOLIC BLOOD PRESSURE: 72 MMHG

## 2020-12-18 DIAGNOSIS — I82.431 ACUTE DEEP VEIN THROMBOSIS (DVT) OF POPLITEAL VEIN OF RIGHT LOWER EXTREMITY (H): Primary | ICD-10-CM

## 2020-12-18 PROCEDURE — 99213 OFFICE O/P EST LOW 20 MIN: CPT | Performed by: FAMILY MEDICINE

## 2020-12-18 NOTE — TELEPHONE ENCOUNTER
Call patient to check in on course.  He was seen in the ER last night and started on Xarelto.  He followed up with primary care today.  He is on a well-established course of anticoagulation.  Unprovoked nature of DVT may warrant further laboratory investigation in the future.  For the time being I recommended a compression sock of some kind to avoid chronic phlebitis pain.

## 2020-12-18 NOTE — PROGRESS NOTES
Subjective     Timothy Londono is a 25 year old male who presents to clinic today for the following health issues:    HPI         ED/UC Followup:    Facility:  [facility]  Date of visit: 12/17/20  Reason for visit: DVT  Current Status: same         Review of Systems   Constitutional, HEENT, cardiovascular, pulmonary, gi and gu systems are negative, except as otherwise noted.      Objective    /72   Pulse 60   Temp 97.7  F (36.5  C) (Tympanic)   Wt 67.1 kg (148 lb)   SpO2 98%   BMI 23.18 kg/m    Body mass index is 23.18 kg/m .  Physical Exam   GENERAL: healthy, alert and no distress  NECK: no adenopathy, no asymmetry, masses, or scars and thyroid normal to palpation  RESP: lungs clear to auscultation - no rales, rhonchi or wheezes  CV: regular rate and rhythm, normal S1 S2, no S3 or S4, no murmur, click or rub, no peripheral edema and peripheral pulses strong  ABDOMEN: soft, nontender, no hepatosplenomegaly, no masses and bowel sounds normal  MS: no gross musculoskeletal defects noted, no edema            Assessment & Plan     Acute deep vein thrombosis (DVT) of popliteal vein of right lower extremity (H)  Started rivaroxaban, will keep him on 15mg bid for next 3 weeks, and will switch to 20mg daily for 1 week, and will review at the clinic for extending 2 additional months,  Then we will recheck again at 3 months gilbert to decide extending period of treatment for the next 3-5 more months and deciding stop treatment vs continuing the DOAC           FUTURE APPOINTMENTS:       - Follow-up visit in 3 weeks     No follow-ups on file.    Fredrick Bird MD  [facility]

## 2021-01-21 ENCOUNTER — OFFICE VISIT (OUTPATIENT)
Dept: FAMILY MEDICINE | Facility: CLINIC | Age: 26
End: 2021-01-21
Payer: COMMERCIAL

## 2021-01-21 VITALS
HEIGHT: 66 IN | HEART RATE: 53 BPM | DIASTOLIC BLOOD PRESSURE: 62 MMHG | TEMPERATURE: 96.8 F | SYSTOLIC BLOOD PRESSURE: 112 MMHG | OXYGEN SATURATION: 100 % | WEIGHT: 146 LBS | BODY MASS INDEX: 23.46 KG/M2

## 2021-01-21 DIAGNOSIS — I82.431 ACUTE DEEP VEIN THROMBOSIS (DVT) OF POPLITEAL VEIN OF RIGHT LOWER EXTREMITY (H): Primary | ICD-10-CM

## 2021-01-21 PROCEDURE — 99213 OFFICE O/P EST LOW 20 MIN: CPT | Performed by: FAMILY MEDICINE

## 2021-01-21 ASSESSMENT — MIFFLIN-ST. JEOR: SCORE: 1586

## 2021-01-21 NOTE — PROGRESS NOTES
"  Assessment & Plan     Acute deep vein thrombosis (DVT) of popliteal vein of right lower extremity (H)  His clinical sx related DVT disappeared, and he stopped Rivaroxavan 1 week ago, will have him to resume it and recheck US for further evaluation and decide if he is safe to stop Rivaroxaban   - US Lower Extremity Venous Duplex Right; Future    Review of external notes as documented above              FUTURE APPOINTMENTS:       - Follow-up after US done     No follow-ups on file.    Fredrick Bird MD  [facility] JACE Aguirre is a 26 year old who presents to clinic today for the following health issues     HPI       ED/UC Followup:    Facility:  [facility]   Date of visit: 12/17/2020  Reason for visit: dvt   Current Status: improved no pain and no swelling        Review of Systems   Constitutional, HEENT, cardiovascular, pulmonary, gi and gu systems are negative, except as otherwise noted.      Objective    /62   Pulse 53   Temp 96.8  F (36  C) (Tympanic)   Ht 1.67 m (5' 5.75\")   Wt 66.2 kg (146 lb)   SpO2 100%   BMI 23.75 kg/m    Body mass index is 23.75 kg/m .  Physical Exam   GENERAL: healthy, alert and no distress  NECK: no adenopathy, no asymmetry, masses, or scars and thyroid normal to palpation  RESP: lungs clear to auscultation - no rales, rhonchi or wheezes  CV: regular rate and rhythm, normal S1 S2, no S3 or S4, no murmur, click or rub, no peripheral edema and peripheral pulses strong  ABDOMEN: soft, nontender, no hepatosplenomegaly, no masses and bowel sounds normal  MS: no gross musculoskeletal defects noted, no edema                "

## 2021-02-04 ENCOUNTER — HOSPITAL ENCOUNTER (OUTPATIENT)
Dept: ULTRASOUND IMAGING | Facility: CLINIC | Age: 26
Discharge: HOME | End: 2021-02-04
Attending: FAMILY MEDICINE | Admitting: FAMILY MEDICINE
Payer: COMMERCIAL

## 2021-02-04 DIAGNOSIS — I82.431 ACUTE DEEP VEIN THROMBOSIS (DVT) OF POPLITEAL VEIN OF RIGHT LOWER EXTREMITY (H): ICD-10-CM

## 2021-02-04 PROCEDURE — 93971 EXTREMITY STUDY: CPT | Mod: RT

## 2021-04-04 ENCOUNTER — HEALTH MAINTENANCE LETTER (OUTPATIENT)
Age: 26
End: 2021-04-04

## 2021-09-18 ENCOUNTER — HEALTH MAINTENANCE LETTER (OUTPATIENT)
Age: 26
End: 2021-09-18

## 2022-04-14 ENCOUNTER — HOSPITAL ENCOUNTER (EMERGENCY)
Age: 27
Discharge: HOME | End: 2022-04-14
Attending: EMERGENCY MEDICINE

## 2022-04-14 VITALS
HEIGHT: 67 IN | TEMPERATURE: 97.9 F | OXYGEN SATURATION: 96 % | DIASTOLIC BLOOD PRESSURE: 80 MMHG | BODY MASS INDEX: 23.54 KG/M2 | SYSTOLIC BLOOD PRESSURE: 122 MMHG | WEIGHT: 150 LBS | HEART RATE: 66 BPM | RESPIRATION RATE: 16 BRPM

## 2022-04-14 DIAGNOSIS — I82.462 ACUTE DEEP VEIN THROMBOSIS (DVT) OF CALF MUSCLE VEIN OF LEFT LOWER EXTREMITY (CMD): Primary | ICD-10-CM

## 2022-04-14 LAB
ALBUMIN SERPL-MCNC: 4 G/DL (ref 3.6–5.1)
ALBUMIN/GLOB SERPL: 1.1 {RATIO} (ref 1–2.4)
ALP SERPL-CCNC: 70 UNITS/L (ref 45–117)
ALT SERPL-CCNC: 39 UNITS/L
ANION GAP SERPL CALC-SCNC: 12 MMOL/L (ref 10–20)
APTT PPP: 25 SEC (ref 22–30)
AST SERPL-CCNC: 31 UNITS/L
BASOPHILS # BLD: 0 K/MCL (ref 0–0.3)
BASOPHILS NFR BLD: 0 %
BILIRUB SERPL-MCNC: 0.5 MG/DL (ref 0.2–1)
BUN SERPL-MCNC: 10 MG/DL (ref 6–20)
BUN/CREAT SERPL: 12 (ref 7–25)
CALCIUM SERPL-MCNC: 9.3 MG/DL (ref 8.4–10.2)
CHLORIDE SERPL-SCNC: 100 MMOL/L (ref 98–107)
CO2 SERPL-SCNC: 29 MMOL/L (ref 21–32)
CREAT SERPL-MCNC: 0.83 MG/DL (ref 0.67–1.17)
DEPRECATED RDW RBC: 40.4 FL (ref 39–50)
EOSINOPHIL # BLD: 0.2 K/MCL (ref 0–0.5)
EOSINOPHIL NFR BLD: 2 %
ERYTHROCYTE [DISTWIDTH] IN BLOOD: 12.1 % (ref 11–15)
FASTING DURATION TIME PATIENT: NORMAL H
GFR SERPLBLD BASED ON 1.73 SQ M-ARVRAT: >90 ML/MIN
GLOBULIN SER-MCNC: 3.6 G/DL (ref 2–4)
GLUCOSE SERPL-MCNC: 86 MG/DL (ref 70–99)
HCT VFR BLD CALC: 45.4 % (ref 39–51)
HGB BLD-MCNC: 15.7 G/DL (ref 13–17)
IMM GRANULOCYTES # BLD AUTO: 0 K/MCL (ref 0–0.2)
IMM GRANULOCYTES # BLD: 0 %
INR PPP: 0.9
LYMPHOCYTES # BLD: 2.2 K/MCL (ref 1–4.8)
LYMPHOCYTES NFR BLD: 26 %
MCH RBC QN AUTO: 31.9 PG (ref 26–34)
MCHC RBC AUTO-ENTMCNC: 34.6 G/DL (ref 32–36.5)
MCV RBC AUTO: 92.3 FL (ref 78–100)
MONOCYTES # BLD: 0.5 K/MCL (ref 0.3–0.9)
MONOCYTES NFR BLD: 6 %
NEUTROPHILS # BLD: 5.5 K/MCL (ref 1.8–7.7)
NEUTROPHILS NFR BLD: 66 %
NRBC BLD MANUAL-RTO: 0 /100 WBC
PLATELET # BLD AUTO: 227 K/MCL (ref 140–450)
POTASSIUM SERPL-SCNC: 4.1 MMOL/L (ref 3.4–5.1)
PROT SERPL-MCNC: 7.6 G/DL (ref 6.4–8.2)
PROTHROMBIN TIME: 10.1 SEC (ref 9.7–11.8)
RAINBOW EXTRA TUBES HOLD SPECIMEN: NORMAL
RBC # BLD: 4.92 MIL/MCL (ref 4.5–5.9)
SODIUM SERPL-SCNC: 137 MMOL/L (ref 135–145)
WBC # BLD: 8.4 K/MCL (ref 4.2–11)

## 2022-04-14 PROCEDURE — 80053 COMPREHEN METABOLIC PANEL: CPT | Performed by: NURSE PRACTITIONER

## 2022-04-14 PROCEDURE — 10002803 HB RX 637: Performed by: STUDENT IN AN ORGANIZED HEALTH CARE EDUCATION/TRAINING PROGRAM

## 2022-04-14 PROCEDURE — 99284 EMERGENCY DEPT VISIT MOD MDM: CPT | Performed by: EMERGENCY MEDICINE

## 2022-04-14 PROCEDURE — 36415 COLL VENOUS BLD VENIPUNCTURE: CPT

## 2022-04-14 PROCEDURE — 85730 THROMBOPLASTIN TIME PARTIAL: CPT | Performed by: EMERGENCY MEDICINE

## 2022-04-14 PROCEDURE — 85025 COMPLETE CBC W/AUTO DIFF WBC: CPT | Performed by: NURSE PRACTITIONER

## 2022-04-14 PROCEDURE — 99283 EMERGENCY DEPT VISIT LOW MDM: CPT

## 2022-04-14 PROCEDURE — 85610 PROTHROMBIN TIME: CPT | Performed by: EMERGENCY MEDICINE

## 2022-04-14 RX ADMIN — RIVAROXABAN 15 MG: 15 TABLET, FILM COATED ORAL at 15:44

## 2022-04-14 ASSESSMENT — ENCOUNTER SYMPTOMS
AGITATION: 0
ABDOMINAL PAIN: 0
ACTIVITY CHANGE: 0
DIZZINESS: 0
TROUBLE SWALLOWING: 0
EYE PAIN: 0
ABDOMINAL DISTENTION: 0
FATIGUE: 0
COUGH: 0
SORE THROAT: 0
DIARRHEA: 0
FEVER: 0
CHEST TIGHTNESS: 0
APNEA: 0
SHORTNESS OF BREATH: 0
APPETITE CHANGE: 0
NAUSEA: 0
HEADACHES: 0
EYE REDNESS: 0
NERVOUS/ANXIOUS: 0
VOMITING: 0
LIGHT-HEADEDNESS: 0
SINUS PRESSURE: 0
WHEEZING: 0
CHOKING: 0
WEAKNESS: 0
CONSTIPATION: 0
STRIDOR: 0
DIAPHORESIS: 0
PHOTOPHOBIA: 0

## 2022-04-14 ASSESSMENT — PAIN SCALES - GENERAL: PAINLEVEL_OUTOF10: 3

## 2022-04-30 ENCOUNTER — HEALTH MAINTENANCE LETTER (OUTPATIENT)
Age: 27
End: 2022-04-30

## 2022-11-20 ENCOUNTER — HEALTH MAINTENANCE LETTER (OUTPATIENT)
Age: 27
End: 2022-11-20

## 2023-06-01 ENCOUNTER — HEALTH MAINTENANCE LETTER (OUTPATIENT)
Age: 28
End: 2023-06-01